# Patient Record
Sex: FEMALE | Race: WHITE | Employment: FULL TIME | URBAN - METROPOLITAN AREA
[De-identification: names, ages, dates, MRNs, and addresses within clinical notes are randomized per-mention and may not be internally consistent; named-entity substitution may affect disease eponyms.]

---

## 2018-10-29 ENCOUNTER — OFFICE VISIT (OUTPATIENT)
Dept: ORTHOPEDIC SURGERY | Age: 58
End: 2018-10-29
Payer: COMMERCIAL

## 2018-10-29 VITALS — HEIGHT: 68 IN | WEIGHT: 230 LBS | BODY MASS INDEX: 34.86 KG/M2

## 2018-10-29 DIAGNOSIS — R52 PAIN: Primary | ICD-10-CM

## 2018-10-29 DIAGNOSIS — G56.02 CARPAL TUNNEL SYNDROME OF LEFT WRIST: ICD-10-CM

## 2018-10-29 DIAGNOSIS — M19.049 HAND ARTHRITIS: ICD-10-CM

## 2018-10-29 PROCEDURE — 99203 OFFICE O/P NEW LOW 30 MIN: CPT | Performed by: ORTHOPAEDIC SURGERY

## 2018-10-29 RX ORDER — OMEPRAZOLE 40 MG/1
CAPSULE, DELAYED RELEASE ORAL
Refills: 5 | COMMUNITY
Start: 2018-09-29

## 2018-10-29 RX ORDER — ESTROGEN,CON/M-PROGEST ACET 0.45-1.5MG
TABLET ORAL
Refills: 3 | COMMUNITY
Start: 2018-09-27

## 2018-10-29 NOTE — PROGRESS NOTES
of multiple IP joints and grade 3 arthritis of the base of the thumb CMC joint    IMPRESSION AND PLAN: Degenerative arthritis of the left thumb. We discussed this common entity and appropriate conservative and surgical options. Appropriate initial steps include activity modification, rest, splinting, hand therapy, and injection. I also recommend utilizing  modifiers that decrease thumb pinch stress. Surgical intervention can usually be reserved for longstanding recalcitrant cases. Today we will demonstrate for her a neoprene thumb spica and CMC wrap which she will obtain on her own. In addition, I have recommended we go ahead and obtain a formalized EMG test to make sure we are not missing an additional element of carpal tunnel syndrome causing her weakness. After I see her back to review the EMG test if her testing does not reveal any significant component of carpal tunnel syndrome, a cortisone injection can also be considered. As a last resort she is well aware a CMC arthroplasty can always be a consideration if necessary. Please note that this transcription was created using voice recognition software. Any errors are unintentional and may be due to voice recognition transcription.

## 2018-11-29 ENCOUNTER — OFFICE VISIT (OUTPATIENT)
Dept: ORTHOPEDIC SURGERY | Age: 58
End: 2018-11-29
Payer: COMMERCIAL

## 2018-11-29 VITALS — BODY MASS INDEX: 34.85 KG/M2 | RESPIRATION RATE: 19 BRPM | HEIGHT: 68 IN | WEIGHT: 229.94 LBS

## 2018-11-29 DIAGNOSIS — M79.602 PAIN OF LEFT UPPER EXTREMITY: Primary | ICD-10-CM

## 2018-11-29 DIAGNOSIS — M19.049 HAND ARTHRITIS: Primary | ICD-10-CM

## 2018-11-29 PROCEDURE — 99213 OFFICE O/P EST LOW 20 MIN: CPT | Performed by: ORTHOPAEDIC SURGERY

## 2018-11-29 PROCEDURE — 20600 DRAIN/INJ JOINT/BURSA W/O US: CPT | Performed by: ORTHOPAEDIC SURGERY

## 2018-11-29 PROCEDURE — 95886 MUSC TEST DONE W/N TEST COMP: CPT | Performed by: PHYSICAL MEDICINE & REHABILITATION

## 2018-11-29 PROCEDURE — 95908 NRV CNDJ TST 3-4 STUDIES: CPT | Performed by: PHYSICAL MEDICINE & REHABILITATION

## 2018-11-29 RX ORDER — PHENTERMINE HYDROCHLORIDE 37.5 MG/1
TABLET ORAL
Refills: 0 | COMMUNITY
Start: 2018-10-30 | End: 2020-12-07 | Stop reason: ALTCHOICE

## 2019-07-25 ENCOUNTER — OFFICE VISIT (OUTPATIENT)
Dept: ORTHOPEDIC SURGERY | Age: 59
End: 2019-07-25
Payer: COMMERCIAL

## 2019-07-25 VITALS — BODY MASS INDEX: 34.85 KG/M2 | WEIGHT: 229.94 LBS | RESPIRATION RATE: 17 BRPM | HEIGHT: 68 IN

## 2019-07-25 DIAGNOSIS — R20.2 NUMBNESS AND TINGLING IN LEFT HAND: ICD-10-CM

## 2019-07-25 DIAGNOSIS — M77.12 LEFT LATERAL EPICONDYLITIS: ICD-10-CM

## 2019-07-25 DIAGNOSIS — R20.0 NUMBNESS AND TINGLING IN LEFT HAND: ICD-10-CM

## 2019-07-25 DIAGNOSIS — M25.522 LEFT ELBOW PAIN: Primary | ICD-10-CM

## 2019-07-25 PROCEDURE — MISCD86 TENNIS ELBOW STRAP-BREG: Performed by: ORTHOPAEDIC SURGERY

## 2019-07-25 PROCEDURE — 99214 OFFICE O/P EST MOD 30 MIN: CPT | Performed by: ORTHOPAEDIC SURGERY

## 2019-07-25 NOTE — PROGRESS NOTES
HISTORY OF PRESENT ILLNESS: The patient returns and reports that perhaps 8 months ago when I injected her left thumb she did have significant relief into her thumb base. Over the summer with golf and with lots of yard work she has developed some pain over the left lateral elbow. In June she was dragging her suitcase through an airport on the way back from [facility] and had increased pain. She continues to play golf every week. PAST MEDICAL HISTORY: Patient's medications, allergies, past medical, surgical, social and family histories were reviewed and updated as appropriate. ROS: Pertinent items are noted in HPI. Review of systems reviewed from patient history form dated on 10/29/2018 and available in the patient's chart under the media tab. Denies fever, chills, confusion, bowel/bladder active change. PHYSICAL EXAMINATION: Examination reveals a pleasant individual in no acute distress. There appears to be satisfactory pain-free range of motion, strength, and stability of the cervical spine, shoulders, wrists, and hands. Skin is intact without lymphadenopathy, discoloration, or abnormal temperature. There is intact, symmetric circulation in both upper extremities. At the left elbow, there is tenderness at the extremes of maximum flexion and extension and pain with firm pressure at the common extensor origin. There is no dramatic tenderness at the radial tunnel. Pain is reproduced with resisted wrist and long finger extension. There is no elbow instability or mechanical locking. There is good firing of the biceps and triceps. She has a slightly positive CMC grind to the left thumb with some mild fullness. There is tenderness when I palpate at the trapeziometacarpal joint. Provocative testing for carpal tunnel syndrome is equivocal.    DIAGNOSTIC TESTING: Three views of the left elbow reveal no sign of fracture, no loose body, and satisfactory articular congruity.     IMPRESSION AND PLAN: Common

## 2020-12-06 ENCOUNTER — APPOINTMENT (OUTPATIENT)
Dept: GENERAL RADIOLOGY | Age: 60
End: 2020-12-06
Payer: COMMERCIAL

## 2020-12-06 ENCOUNTER — HOSPITAL ENCOUNTER (EMERGENCY)
Age: 60
Discharge: HOME | End: 2020-12-06
Attending: EMERGENCY MEDICINE
Payer: COMMERCIAL

## 2020-12-06 VITALS
RESPIRATION RATE: 17 BRPM | OXYGEN SATURATION: 97 % | TEMPERATURE: 98.1 F | DIASTOLIC BLOOD PRESSURE: 94 MMHG | SYSTOLIC BLOOD PRESSURE: 165 MMHG | HEART RATE: 96 BPM

## 2020-12-06 LAB
A/G RATIO: 1.2 (ref 1.1–2.2)
ALBUMIN SERPL-MCNC: 4 G/DL (ref 3.4–5)
ALP BLD-CCNC: 65 U/L (ref 40–129)
ALT SERPL-CCNC: 28 U/L (ref 10–40)
ANION GAP SERPL CALCULATED.3IONS-SCNC: 6 MMOL/L (ref 3–16)
AST SERPL-CCNC: 30 U/L (ref 15–37)
BASOPHILS ABSOLUTE: 0 K/UL (ref 0–0.2)
BASOPHILS RELATIVE PERCENT: 0.7 %
BILIRUB SERPL-MCNC: 0.3 MG/DL (ref 0–1)
BUN BLDV-MCNC: 10 MG/DL (ref 7–20)
CALCIUM SERPL-MCNC: 8.9 MG/DL (ref 8.3–10.6)
CHLORIDE BLD-SCNC: 103 MMOL/L (ref 99–110)
CO2: 30 MMOL/L (ref 21–32)
CREAT SERPL-MCNC: 0.7 MG/DL (ref 0.6–1.1)
EKG ATRIAL RATE: 83 BPM
EKG DIAGNOSIS: NORMAL
EKG P AXIS: 40 DEGREES
EKG P-R INTERVAL: 154 MS
EKG Q-T INTERVAL: 402 MS
EKG QRS DURATION: 118 MS
EKG QTC CALCULATION (BAZETT): 472 MS
EKG R AXIS: 30 DEGREES
EKG T AXIS: 12 DEGREES
EKG VENTRICULAR RATE: 83 BPM
EOSINOPHILS ABSOLUTE: 0.1 K/UL (ref 0–0.6)
EOSINOPHILS RELATIVE PERCENT: 1.6 %
GFR AFRICAN AMERICAN: >60
GFR NON-AFRICAN AMERICAN: >60
GLOBULIN: 3.3 G/DL
GLUCOSE BLD-MCNC: 108 MG/DL (ref 70–99)
HCT VFR BLD CALC: 44.4 % (ref 36–48)
HEMOGLOBIN: 14.9 G/DL (ref 12–16)
LYMPHOCYTES ABSOLUTE: 1.2 K/UL (ref 1–5.1)
LYMPHOCYTES RELATIVE PERCENT: 27.3 %
MCH RBC QN AUTO: 29.3 PG (ref 26–34)
MCHC RBC AUTO-ENTMCNC: 33.6 G/DL (ref 31–36)
MCV RBC AUTO: 87 FL (ref 80–100)
MONOCYTES ABSOLUTE: 0.3 K/UL (ref 0–1.3)
MONOCYTES RELATIVE PERCENT: 7.2 %
NEUTROPHILS ABSOLUTE: 2.9 K/UL (ref 1.7–7.7)
NEUTROPHILS RELATIVE PERCENT: 63.2 %
PDW BLD-RTO: 13.1 % (ref 12.4–15.4)
PLATELET # BLD: 147 K/UL (ref 135–450)
PMV BLD AUTO: 8.5 FL (ref 5–10.5)
POTASSIUM SERPL-SCNC: 3.8 MMOL/L (ref 3.5–5.1)
RBC # BLD: 5.1 M/UL (ref 4–5.2)
SODIUM BLD-SCNC: 139 MMOL/L (ref 136–145)
TOTAL PROTEIN: 7.3 G/DL (ref 6.4–8.2)
TROPONIN: <0.01 NG/ML
WBC # BLD: 4.6 K/UL (ref 4–11)

## 2020-12-06 PROCEDURE — 80053 COMPREHEN METABOLIC PANEL: CPT

## 2020-12-06 PROCEDURE — 85025 COMPLETE CBC W/AUTO DIFF WBC: CPT

## 2020-12-06 PROCEDURE — 94640 AIRWAY INHALATION TREATMENT: CPT

## 2020-12-06 PROCEDURE — 99284 EMERGENCY DEPT VISIT MOD MDM: CPT

## 2020-12-06 PROCEDURE — 93005 ELECTROCARDIOGRAM TRACING: CPT | Performed by: EMERGENCY MEDICINE

## 2020-12-06 PROCEDURE — 84484 ASSAY OF TROPONIN QUANT: CPT

## 2020-12-06 PROCEDURE — 93010 ELECTROCARDIOGRAM REPORT: CPT | Performed by: INTERNAL MEDICINE

## 2020-12-06 PROCEDURE — 6370000000 HC RX 637 (ALT 250 FOR IP): Performed by: EMERGENCY MEDICINE

## 2020-12-06 PROCEDURE — 71045 X-RAY EXAM CHEST 1 VIEW: CPT

## 2020-12-06 RX ORDER — IPRATROPIUM BROMIDE AND ALBUTEROL SULFATE 2.5; .5 MG/3ML; MG/3ML
1 SOLUTION RESPIRATORY (INHALATION)
Status: DISCONTINUED | OUTPATIENT
Start: 2020-12-06 | End: 2020-12-06 | Stop reason: HOSPADM

## 2020-12-06 RX ADMIN — IPRATROPIUM BROMIDE AND ALBUTEROL SULFATE 1 AMPULE: .5; 3 SOLUTION RESPIRATORY (INHALATION) at 04:18

## 2020-12-06 ASSESSMENT — ENCOUNTER SYMPTOMS
COUGH: 0
SORE THROAT: 0
CONSTIPATION: 0
BACK PAIN: 0
NAUSEA: 0
SHORTNESS OF BREATH: 1
VOMITING: 0
DIARRHEA: 1
ABDOMINAL PAIN: 0

## 2020-12-06 NOTE — ED PROVIDER NOTES
[facility]  ED  EMERGENCY DEPARTMENT ENCOUNTER      Pt Name: Lacey Ramirez  MRN: [id number]  [place] 1960  Date of evaluation: 12/6/2020  Provider: Kierra Gustafson MD    [address] [place] [address]       Chief Complaint   Patient presents with    Shortness of Breath     tested positive for COVID 1 week ago; increased shortness of breath tonight          HISTORY OF PRESENT ILLNESS   (Location/Symptom, Timing/Onset, Context/Setting, Quality, Duration, Modifying Factors, Severity)  Note limiting factors. Lacey Ramirez is a 62 y.o. female who presents to the emergency department     Patient is a 64-year-old female with past medical history of seasonal allergies and seasonal bronchitis who presents with chest heaviness and worsening shortness of breath after being diagnosed with Covid 1 week ago. Patient reports that she had some allergy symptoms the week of Thanksgiving but then had a fever the Friday after Thanksgiving. She was tested for Covid on that Saturday and came back positive. Patient has been managing her symptoms at home and watching her oxygen saturation with a pulse oximeter. She states that even though she feels short of breath especially on exertion her oxygen level has not dipped below an appropriate level. She states that tonight she was woken up from sleep gasping for breath. She reports a heaviness in the center of her chest and no matter what she does she cannot get rid of it. She tried her inhaler without any improvement. The history is provided by the patient. Nursing Notes were reviewed. REVIEW OF SYSTEMS    (2-9 systems for level 4, 10 or more for level 5)     Review of Systems   Constitutional: Positive for fatigue and fever. Negative for chills. HENT: Negative for congestion and sore throat. Eyes: Negative for visual disturbance. Respiratory: Positive for shortness of breath. Negative for cough. Cardiovascular: Positive for chest pain. Gastrointestinal: Positive for diarrhea. Negative for abdominal pain, constipation, nausea and vomiting. Genitourinary: Negative for dysuria and hematuria. Musculoskeletal: Negative for back pain and neck pain. Skin: Negative for pallor and rash. Neurological: Negative for light-headedness and headaches. All other systems reviewed and are negative. Except as noted above the remainder of the review of systems was reviewed and negative. PAST MEDICAL HISTORY   History reviewed. No pertinent past medical history. SURGICAL HISTORY     History reviewed. No pertinent surgical history. CURRENT MEDICATIONS       Discharge Medication List as of 12/6/2020  4:53 AM      CONTINUE these medications which have NOT CHANGED    Details   PREMPRO 0.45-1.5 MG per tablet TAKE 1 TABLET BY MOUTH DAILY., R-3, DAWHistorical Med      omeprazole (PRILOSEC) 40 MG delayed release capsule TAKE 1 CAPSULE BY MOUTH EVERY DAY, R-5Historical Med      phentermine (ADIPEX-P) 37.5 MG tablet R-0Historical Med             ALLERGIES     Codeine    FAMILY HISTORY     History reviewed. No pertinent family history.        SOCIAL HISTORY       Social History     Socioeconomic History    Marital status:      Spouse name: None    Number of children: None    Years of education: None    Highest education level: None   Occupational History    None   Social Needs    Financial resource strain: None    Food insecurity     Worry: None     Inability: None    Transportation needs     Medical: None     Non-medical: None   Tobacco Use    Smoking status: None    Smokeless tobacco: Never Used   Substance and Sexual Activity    Alcohol use: None    Drug use: None    Sexual activity: None   Lifestyle    Physical activity     Days per week: None     Minutes per session: None    Stress: None   Relationships    Social connections     Talks on phone: None     Gets together: None     Attends Yazidi service: None     Active member of club or organization: None     Attends meetings of clubs or organizations: None     Relationship status: None    Intimate partner violence     Fear of current or ex partner: None     Emotionally abused: None     Physically abused: None     Forced sexual activity: None   Other Topics Concern    None   Social History Narrative    None       SCREENINGS    Eugene Coma Scale  Eye Opening: Spontaneous  Best Verbal Response: Oriented  Best Motor Response: Obeys commands  Eugene Coma Scale Score: 15          PHYSICAL EXAM    (up to 7 for level 4, 8 or more for level 5)     ED Triage Vitals [12/06/20 0300]   BP Temp Temp src Pulse Resp SpO2 Height Weight   -- -- -- 106 -- 96 % -- --       Physical Exam  Vitals signs and nursing note reviewed. Constitutional:       General: She is not in acute distress. Appearance: Normal appearance. HENT:      Head: Normocephalic and atraumatic. Nose: Nose normal. No congestion. Mouth/Throat:      Mouth: Mucous membranes are moist.   Eyes:      Conjunctiva/sclera: Conjunctivae normal.   Neck:      Musculoskeletal: Normal range of motion and neck supple. Cardiovascular:      Rate and Rhythm: Normal rate and regular rhythm. Pulses: Normal pulses. Heart sounds: No murmur. Pulmonary:      Effort: Pulmonary effort is normal. No respiratory distress. Breath sounds: Normal breath sounds. Abdominal:      General: There is no distension. Palpations: Abdomen is soft. Tenderness: There is no abdominal tenderness. Musculoskeletal: Normal range of motion. General: No swelling or deformity. Skin:     General: Skin is warm and dry. Neurological:      General: No focal deficit present. Mental Status: She is alert and oriented to person, place, and time.          DIAGNOSTIC RESULTS     EKG: All EKG's are interpreted by the Emergency Department Physician who either signs or Co-signs this chart in the absence of a cardiologist.    Normal sinus rhythm, rate 83, normal intervals, no STEMI    RADIOLOGY:     Interpretation per the Radiologist below, if available at the time of this note:    XR CHEST PORTABLE   Final Result   No radiographic evidence of acute pulmonary disease. LABS:  Labs Reviewed   COMPREHENSIVE METABOLIC PANEL - Abnormal; Notable for the following components:       Result Value    Glucose 108 (*)     All other components within normal limits    Narrative:     Performed at:  [address], [facility] [place]   Phone [phone]   CBC WITH AUTO DIFFERENTIAL    Narrative:     Performed at:  [place] [address] [place]   Phone [phone]   TROPONIN    Narrative:     Performed at:  [place] [address] [place]   Phone [phone]       All other labs were within normal range or not returned as of this dictation. EMERGENCY DEPARTMENT COURSE and DIFFERENTIAL DIAGNOSIS/MDM:   Vitals:    Vitals:    12/06/20 0300 12/06/20 0326 12/06/20 0426   BP:  (!) 165/94    Pulse: 106 96    Resp:  17    Temp:  98.1 °F (36.7 °C)    TempSrc:  Oral    SpO2: 96% 96% 97%       Patient evaluated and previous record reviewed. Patient presents with worsening shortness of breath in the setting of known Covid infection. Vital signs stable and within normal limits. Physical exam as documented above. EKG without acute changes. Lab work-up largely unremarkable. Chest x-ray without acute cardiopulmonary abnormality. Patient given breathing treatment with improvement in symptoms but still feels as though there is a lump in her chest.  Feels though patient symptoms are most likely related to her ongoing infection with COVID-19. Patient is comfortable with discharge home at this time.   Advised her at home care instructions as well as return precautions and she voices understanding. Patient is already monitoring her oxygen level at home with a pulse oximeter. CONSULTS:  None    PROCEDURES:  Unless otherwise noted below, none     Procedures      FINAL IMPRESSION      1. COVID-19    2. Shortness of breath    3. Chest heaviness          DISPOSITION/PLAN   DISPOSITION        PATIENT REFERRED TO:  Hector Maguire    Call   As needed      DISCHARGE MEDICATIONS:  Discharge Medication List as of 12/6/2020  4:53 AM        Controlled Substances Monitoring:     No flowsheet data found.     (Please note that portions of this note were completed with a voice recognition program.  Efforts were made to edit the dictations but occasionally words are mis-transcribed.)    Aftab De MD (electronically signed)  Attending Emergency Physician           Paula Peterson MD  12/06/20 8125

## 2020-12-07 ENCOUNTER — CARE COORDINATION (OUTPATIENT)
Dept: CARE COORDINATION | Age: 60
End: 2020-12-07

## 2020-12-07 NOTE — CARE COORDINATION
Chart reviewed. Pt seen and evaluated in ED for COVID-19. Pt tested (+) for COVID 1 week prior to ED visit. Pt given the following referrals/recommendations (see below):    - Call [facility]; As needed     Initial ED f/u call to pt attempted. Unable to reach pt. Message left requesting return call. Per chart review (Davi, pt has been in contact with her PCP office and the Health Department per [facility] note on 12/4/2020). Return call requested, if no return call, will attempt second ED f/u call later today or tomorrow.      FU appts/Provider:    ([facility]):    12/14/2020 Office Visit Dermatology Kait Javier MD    [address] [place] [address]   [phone] (Fax)

## 2020-12-07 NOTE — CARE COORDINATION
Patient contacted regarding UYNGE-70 diagnosis\". Discussed COVID-19 related testing which was available at this time. Test results were tested in [place], [place] after Thanksgiving (+). Patient informed of results, if available? Pt was aware of results    Care Transition Nurse/ Ambulatory Care Manager contacted the patient by telephone to perform post discharge assessment. Call within 2 business days of discharge: Yes. Verified name and  with patient as identifiers. Provided introduction to self, and explanation of the CTN/ACM role, and reason for call due to risk factors for infection and/or exposure to COVID-19. Symptoms reviewed with patient who verbalized the following symptoms: fatigue, pain or aching joints, cough, loss of taste or smell, nausea, diarrhea, flank pain, no new symptoms, no worsening symptoms and improving- is monitoring HR, O2 93-98%. Due to no new or worsening symptoms encounter was not routed to provider for escalation. Discussed follow-up appointments. If no appointment was previously scheduled, appointment scheduling offered: Followed by outside facility  [facility] follow up appointment(s): No future appointments. Non-[facility] follow up appointment(s): Pt declined appt at this time. Non-face-to-face services provided:  Obtained and reviewed discharge summary and/or continuity of care documents     Advance Care Planning:   Does patient have an Advance Directive:  reviewed and current. Patient has following risk factors of: COVID dx and recent ED visit. CTN/ACM reviewed discharge instructions, medical action plan and red flags such as increased shortness of breath, increasing fever and signs of decompensation with patient who verbalized understanding. Discussed exposure protocols and quarantine with CDC Guidelines What to do if you are sick with coronavirus disease .  Patient was given an opportunity for questions and concerns.  The patient agrees to contact the [facility] exposure line [phone], local [facility] department [place] [facility]: ([phone]) and PCP office for questions related to their healthcare. CTN/ACM provided contact information for future needs. Pt lives in [place]. [facility] provided pt with the following numbers:  [facility] [phone]  And  Nursing Division: [phone]    - Pt to f/u with her pharmacy [facility] for clarification on how she is taking her Vit D to ensure she is not exceeding recommended dose. Reviewed and educated patient on any new and changed medications related to discharge diagnosis     Patient/family/caregiver given information for [facility] and agrees to enroll no    Plan for follow-up call in 5-7 days based on severity of symptoms and risk factors.

## 2020-12-11 ENCOUNTER — CARE COORDINATION (OUTPATIENT)
Dept: CARE COORDINATION | Age: 60
End: 2020-12-11

## 2020-12-11 NOTE — CARE COORDINATION
Patient contacted regarding COVID-19 risk and screening. Discussed COVID-19 related testing which was Tested out of stated a on 11/25/2020 per previous call/conversation at this time. Test results were see above. Patient informed of results, if available? Previously discussed. F/U call attempted. Message left requesting call back. If no return call, will attempt to reach pt next week. ACM encouraged pt f/u with PCP for any health related questions or concerns. Plan for follow-up call in 5-7 days based on severity of symptoms and risk factors.     FU appts/Provider:    Garth Monteneg[place]):    Pt had OV on 12/10/2020    12/14/2020 Office Visit Dermatology Kellie Mcgovern MD    [address] [facility] [facility]   [phone] (Fax)         Scheduled Orders  Scheduled Orders   Name Type Priority Associated Diagnoses Order Schedule   URINALYSIS WITH MICROSCOPIC Lab Today Dysuria   Expected: 12/10/2020, Expires: 12/10/2021   CULTURE URINE Microbiology Today Dysuria   Expected: 12/10/2020, Expires: 12/10/2021

## 2020-12-15 ENCOUNTER — CARE COORDINATION (OUTPATIENT)
Dept: CARE COORDINATION | Age: 60
End: 2020-12-15

## 2020-12-15 NOTE — CARE COORDINATION
You Patient resolved from the Care Transitions episode on 12/15/2020  Discussed COVID-19 related testing which was tested week prior to ED visit at this time. Test results were positive. Patient informed of results, if available? Previously discussed results due to pt being tested prior to ED visit. Unable to reach pt for final ED f/u call. This episode has been closed. Message left for pt to f/u with PCP for any further questions or concerns related to COVID or her health. No further outreach scheduled with this CTN/ACM. Episode of Care resolved. Patient has this CTN/ACM contact information if future needs arise.